# Patient Record
Sex: MALE | Race: WHITE | NOT HISPANIC OR LATINO | URBAN - METROPOLITAN AREA
[De-identification: names, ages, dates, MRNs, and addresses within clinical notes are randomized per-mention and may not be internally consistent; named-entity substitution may affect disease eponyms.]

---

## 2018-06-22 ENCOUNTER — EMERGENCY (EMERGENCY)
Facility: HOSPITAL | Age: 38
LOS: 1 days | Discharge: HOME | End: 2018-06-22
Attending: EMERGENCY MEDICINE
Payer: COMMERCIAL

## 2018-06-22 ENCOUNTER — TRANSCRIPTION ENCOUNTER (OUTPATIENT)
Age: 38
End: 2018-06-22

## 2018-06-22 VITALS
HEART RATE: 66 BPM | TEMPERATURE: 98 F | WEIGHT: 250 LBS | DIASTOLIC BLOOD PRESSURE: 83 MMHG | SYSTOLIC BLOOD PRESSURE: 134 MMHG | RESPIRATION RATE: 17 BRPM | HEIGHT: 73 IN | OXYGEN SATURATION: 98 %

## 2018-06-22 LAB — GAS PNL BLDV: SIGNIFICANT CHANGE UP

## 2018-06-22 PROCEDURE — 99243 OFF/OP CNSLTJ NEW/EST LOW 30: CPT

## 2018-06-22 PROCEDURE — 99284 EMERGENCY DEPT VISIT MOD MDM: CPT

## 2018-06-22 NOTE — ED PROVIDER NOTE - ATTENDING CONTRIBUTION TO CARE
attending Troy: 37yM previously healthy sent in from urgent care for abdominal pain and rectal bleeding. Reports 1 day intermittent periumbilical abdominal pain and one episode of BRBPR. Denies rectal pain, vomiting, anorexia, fam h/o colon CA. Started new keto diet recently. Denies ingesting beets. On exam, VSS, well-appearing, pink conjunctiva, MMM, abdomen soft, mild tenderness periumbilically, rectal exam brown stool guaiac negative, no anal fissure or palpable hemorrhoids. Will obtain labs, Ct A/P given abdominal tenderness and reassess.

## 2018-06-22 NOTE — ED PROVIDER NOTE - MEDICAL DECISION MAKING DETAILS
attending Pollack: periumbilical pain and reported rectal bleeding. Brown stool guaiac negative in ED. Will obtain labs, Ct A/P given abdominal tenderness eval for appendicitis and reassess.

## 2018-06-22 NOTE — ED PROVIDER NOTE - PROGRESS NOTE DETAILS
attending Troy: surgery at bedside attending Troy; cleared by surgical attending Lindsay for dc. Will dc with GI for reported rectal bleeding. No anemia in ED, guaiac negative.

## 2018-06-22 NOTE — ED ADULT NURSE NOTE - OBJECTIVE STATEMENT
Pt presents to the ED with complaint of abdominal pain. Pt AXOX3, reports pain starting this am at midline lower abdominal pain. No radiation, no CVA tenderness. Abdomen soft, nondistended, nontender to palpation. Pt had two episodes of blood mixed with loose stool during the day, was seen at urgent care and sent to ED. Pt denies nausea vomiting or diarrhea. No chest pain, no shortness of breath, skins color normal for age and race. no dysuria or hematuria, no fevers or chills at home.

## 2018-06-22 NOTE — ED PROVIDER NOTE - OBJECTIVE STATEMENT
attending Troy: 37yM previously healthy sent in from urgent care for abdominal pain and rectal bleeding. Reports 1 day intermittent periumbilical abdominal pain and one episode of BRBPR. Denies rectal pain, vomiting, anorexia, fam h/o colon CA. Started new keto diet recently. Denies ingesting beets.

## 2018-06-22 NOTE — ED PROVIDER NOTE - PHYSICAL EXAMINATION
On exam, VSS, well-appearing, pink conjunctiva, MMM, abdomen soft, mild tenderness periumbilically, rectal exam brown stool guaiac negative, no anal fissure or palpable hemorrhoids.

## 2018-06-23 VITALS
SYSTOLIC BLOOD PRESSURE: 129 MMHG | OXYGEN SATURATION: 98 % | RESPIRATION RATE: 18 BRPM | DIASTOLIC BLOOD PRESSURE: 81 MMHG | TEMPERATURE: 98 F | HEART RATE: 56 BPM

## 2018-06-23 LAB
ALBUMIN SERPL ELPH-MCNC: 4.9 G/DL — SIGNIFICANT CHANGE UP (ref 3.3–5)
ALP SERPL-CCNC: 52 U/L — SIGNIFICANT CHANGE UP (ref 40–120)
ALT FLD-CCNC: 31 U/L — SIGNIFICANT CHANGE UP (ref 10–45)
ANION GAP SERPL CALC-SCNC: 14 MMOL/L — SIGNIFICANT CHANGE UP (ref 5–17)
APPEARANCE UR: CLEAR — SIGNIFICANT CHANGE UP
APTT BLD: 32.5 SEC — SIGNIFICANT CHANGE UP (ref 27.5–37.4)
AST SERPL-CCNC: 21 U/L — SIGNIFICANT CHANGE UP (ref 10–40)
BASOPHILS # BLD AUTO: 0.1 K/UL — SIGNIFICANT CHANGE UP (ref 0–0.2)
BASOPHILS NFR BLD AUTO: 1.1 % — SIGNIFICANT CHANGE UP (ref 0–2)
BILIRUB SERPL-MCNC: 0.4 MG/DL — SIGNIFICANT CHANGE UP (ref 0.2–1.2)
BILIRUB UR-MCNC: NEGATIVE — SIGNIFICANT CHANGE UP
BUN SERPL-MCNC: 16 MG/DL — SIGNIFICANT CHANGE UP (ref 7–23)
CALCIUM SERPL-MCNC: 9.1 MG/DL — SIGNIFICANT CHANGE UP (ref 8.4–10.5)
CHLORIDE SERPL-SCNC: 101 MMOL/L — SIGNIFICANT CHANGE UP (ref 96–108)
CO2 SERPL-SCNC: 25 MMOL/L — SIGNIFICANT CHANGE UP (ref 22–31)
COLOR SPEC: YELLOW — SIGNIFICANT CHANGE UP
CREAT SERPL-MCNC: 1.3 MG/DL — SIGNIFICANT CHANGE UP (ref 0.5–1.3)
DIFF PNL FLD: NEGATIVE — SIGNIFICANT CHANGE UP
EOSINOPHIL # BLD AUTO: 0.2 K/UL — SIGNIFICANT CHANGE UP (ref 0–0.5)
EOSINOPHIL NFR BLD AUTO: 2.2 % — SIGNIFICANT CHANGE UP (ref 0–6)
EPI CELLS # UR: SIGNIFICANT CHANGE UP /HPF
GLUCOSE SERPL-MCNC: 86 MG/DL — SIGNIFICANT CHANGE UP (ref 70–99)
GLUCOSE UR QL: NEGATIVE — SIGNIFICANT CHANGE UP
HCT VFR BLD CALC: 45.9 % — SIGNIFICANT CHANGE UP (ref 39–50)
HGB BLD-MCNC: 15.4 G/DL — SIGNIFICANT CHANGE UP (ref 13–17)
INR BLD: 1.09 RATIO — SIGNIFICANT CHANGE UP (ref 0.88–1.16)
KETONES UR-MCNC: NEGATIVE — SIGNIFICANT CHANGE UP
LEUKOCYTE ESTERASE UR-ACNC: NEGATIVE — SIGNIFICANT CHANGE UP
LIDOCAIN IGE QN: 24 U/L — SIGNIFICANT CHANGE UP (ref 7–60)
LYMPHOCYTES # BLD AUTO: 3.5 K/UL — HIGH (ref 1–3.3)
LYMPHOCYTES # BLD AUTO: 41.5 % — SIGNIFICANT CHANGE UP (ref 13–44)
MCHC RBC-ENTMCNC: 30.1 PG — SIGNIFICANT CHANGE UP (ref 27–34)
MCHC RBC-ENTMCNC: 33.5 GM/DL — SIGNIFICANT CHANGE UP (ref 32–36)
MCV RBC AUTO: 89.8 FL — SIGNIFICANT CHANGE UP (ref 80–100)
MONOCYTES # BLD AUTO: 0.7 K/UL — SIGNIFICANT CHANGE UP (ref 0–0.9)
MONOCYTES NFR BLD AUTO: 8.1 % — SIGNIFICANT CHANGE UP (ref 2–14)
NEUTROPHILS # BLD AUTO: 4 K/UL — SIGNIFICANT CHANGE UP (ref 1.8–7.4)
NEUTROPHILS NFR BLD AUTO: 47.1 % — SIGNIFICANT CHANGE UP (ref 43–77)
NITRITE UR-MCNC: NEGATIVE — SIGNIFICANT CHANGE UP
PH UR: 6 — SIGNIFICANT CHANGE UP (ref 5–8)
PLATELET # BLD AUTO: 213 K/UL — SIGNIFICANT CHANGE UP (ref 150–400)
POTASSIUM SERPL-MCNC: 3.8 MMOL/L — SIGNIFICANT CHANGE UP (ref 3.5–5.3)
POTASSIUM SERPL-SCNC: 3.8 MMOL/L — SIGNIFICANT CHANGE UP (ref 3.5–5.3)
PROT SERPL-MCNC: 7.7 G/DL — SIGNIFICANT CHANGE UP (ref 6–8.3)
PROT UR-MCNC: NEGATIVE — SIGNIFICANT CHANGE UP
PROTHROM AB SERPL-ACNC: 11.9 SEC — SIGNIFICANT CHANGE UP (ref 9.8–12.7)
RBC # BLD: 5.11 M/UL — SIGNIFICANT CHANGE UP (ref 4.2–5.8)
RBC # FLD: 11.5 % — SIGNIFICANT CHANGE UP (ref 10.3–14.5)
RBC CASTS # UR COMP ASSIST: SIGNIFICANT CHANGE UP /HPF (ref 0–2)
SODIUM SERPL-SCNC: 140 MMOL/L — SIGNIFICANT CHANGE UP (ref 135–145)
SP GR SPEC: 1.01 — SIGNIFICANT CHANGE UP (ref 1.01–1.02)
UROBILINOGEN FLD QL: NEGATIVE — SIGNIFICANT CHANGE UP
WBC # BLD: 8.4 K/UL — SIGNIFICANT CHANGE UP (ref 3.8–10.5)
WBC # FLD AUTO: 8.4 K/UL — SIGNIFICANT CHANGE UP (ref 3.8–10.5)
WBC UR QL: SIGNIFICANT CHANGE UP /HPF (ref 0–5)

## 2018-06-23 PROCEDURE — 85730 THROMBOPLASTIN TIME PARTIAL: CPT

## 2018-06-23 PROCEDURE — 82803 BLOOD GASES ANY COMBINATION: CPT

## 2018-06-23 PROCEDURE — 84132 ASSAY OF SERUM POTASSIUM: CPT

## 2018-06-23 PROCEDURE — 83690 ASSAY OF LIPASE: CPT

## 2018-06-23 PROCEDURE — 82330 ASSAY OF CALCIUM: CPT

## 2018-06-23 PROCEDURE — 83605 ASSAY OF LACTIC ACID: CPT

## 2018-06-23 PROCEDURE — 74177 CT ABD & PELVIS W/CONTRAST: CPT | Mod: 26

## 2018-06-23 PROCEDURE — 82272 OCCULT BLD FECES 1-3 TESTS: CPT

## 2018-06-23 PROCEDURE — 80053 COMPREHEN METABOLIC PANEL: CPT

## 2018-06-23 PROCEDURE — 82435 ASSAY OF BLOOD CHLORIDE: CPT

## 2018-06-23 PROCEDURE — 81001 URINALYSIS AUTO W/SCOPE: CPT

## 2018-06-23 PROCEDURE — 85027 COMPLETE CBC AUTOMATED: CPT

## 2018-06-23 PROCEDURE — 82947 ASSAY GLUCOSE BLOOD QUANT: CPT

## 2018-06-23 PROCEDURE — 99285 EMERGENCY DEPT VISIT HI MDM: CPT | Mod: 25

## 2018-06-23 PROCEDURE — 85014 HEMATOCRIT: CPT

## 2018-06-23 PROCEDURE — 84295 ASSAY OF SERUM SODIUM: CPT

## 2018-06-23 PROCEDURE — 82565 ASSAY OF CREATININE: CPT

## 2018-06-23 PROCEDURE — 74177 CT ABD & PELVIS W/CONTRAST: CPT

## 2018-06-23 PROCEDURE — 85610 PROTHROMBIN TIME: CPT

## 2018-06-23 NOTE — CONSULT NOTE ADULT - SUBJECTIVE AND OBJECTIVE BOX
HPI: 38 yo man without prior medical or surgical history presenting with intermittent periumbilical pain for one day with an episode of blood-laced bowel movement. Patient without emesis, tolerating PO. Patient without RLQ tenderness and CT with contrast filling the appendix; equivocal for tip appendicitis. No fever or leukocytosis.     PMHx: none  PSHx: none    Medications (inpatient):   Medications (PRN):  Allergies: No Known Allergies  (Intolerances: )  Social Hx:     Physical Exam  T(C): 36.7 (18 @ 04:01)  HR: 56 (18 @ 04:01) (56 - 66)  BP: 129/81 (18 @ 04:01) (129/81 - 134/83)  RR: 18 (18 @ 04:01) (17 - 18)  SpO2: 98% (18 @ 04:01) (98% - 98%)  Tmax: T(C): , Max: 36.7 (18 @ 04:01)    General: well developed, well nourished, NAD  Neuro: alert and oriented, no focal deficits, moves all extremities spontaneously  HEENT: NCAT, EOMI, anicteric, mucosa moist  Respiratory: airway patent, respirations unlabored  CVS: regular rate and rhythm  Abdomen: soft, nontender, nondistended.   Extremities: no edema, sensation and movement grossly intact  Skin: warm, dry, appropriate color    Labs:                        15.4   8.4   )-----------( 213      ( 2018 23:49 )             45.9     PT/INR - ( 2018 23:49 )   PT: 11.9 sec;   INR: 1.09 ratio         PTT - ( 2018 23:49 )  PTT:32.5 sec      140  |  101  |  16  ----------------------------<  86  3.8   |  25  |  1.30    Ca    9.1      2018 23:49    TPro  7.7  /  Alb  4.9  /  TBili  0.4  /  DBili  x   /  AST  21  /  ALT  31  /  AlkPhos  52      Urinalysis Basic - ( 2018 00:23 )    Color: Yellow / Appearance: Clear / S.015 / pH: x  Gluc: x / Ketone: Negative  / Bili: Negative / Urobili: Negative   Blood: x / Protein: Negative / Nitrite: Negative   Leuk Esterase: Negative / RBC: 0-2 /HPF / WBC 3-5 /HPF   Sq Epi: x / Non Sq Epi: OCC /HPF / Bacteria: x            Imaging and other studies:

## 2018-06-23 NOTE — CONSULT NOTE ADULT - ASSESSMENT
36 yo man presenting with periumbilical abdominal pain and "spider-web" streaked stool with abdominal exam and labs not consistent with acute appendicitis.     - Return to ED if symptoms worsen.   - Discussed above with Dr. Lindsay Mcdonald MD PGY2

## 2018-06-23 NOTE — CONSULT NOTE ADULT - ATTENDING COMMENTS
seen and examined 6/23/2018 @ 0325    37M with 1 day of intermittent suprapubic pain and nausea.  also describes streaks of blood around stool, history of hemorrhoids and never had colonoscopy.  currently hungry.    no RLQ tednerness    WBC = 8    CT abd/pelvis - contrast filled appendix, can't r/o tip appendicitis, but no surrounding inflammation    His presentation is not consistent with acute appendicitis.  I told him to return to ER if symptoms worsen or fail to subside.  I also told him that he should undergo colonoscopy for LGIB because this could be a sign of colorectal cancer which has better outcomes if diagnosed and treated early.  -ER will refer to outpatient GI

## 2018-07-23 ENCOUNTER — TRANSCRIPTION ENCOUNTER (OUTPATIENT)
Age: 38
End: 2018-07-23

## 2018-07-26 ENCOUNTER — TRANSCRIPTION ENCOUNTER (OUTPATIENT)
Age: 38
End: 2018-07-26

## 2019-09-13 PROBLEM — Z00.00 ENCOUNTER FOR PREVENTIVE HEALTH EXAMINATION: Status: ACTIVE | Noted: 2019-09-13

## 2019-09-30 ENCOUNTER — APPOINTMENT (OUTPATIENT)
Dept: GASTROENTEROLOGY | Facility: CLINIC | Age: 39
End: 2019-09-30
Payer: SELF-PAY

## 2019-09-30 VITALS
OXYGEN SATURATION: 97 % | TEMPERATURE: 97.5 F | DIASTOLIC BLOOD PRESSURE: 80 MMHG | WEIGHT: 271 LBS | SYSTOLIC BLOOD PRESSURE: 130 MMHG | HEART RATE: 51 BPM | HEIGHT: 74 IN | BODY MASS INDEX: 34.78 KG/M2

## 2019-09-30 DIAGNOSIS — R07.89 OTHER CHEST PAIN: ICD-10-CM

## 2019-09-30 PROCEDURE — 99204 OFFICE O/P NEW MOD 45 MIN: CPT

## 2019-09-30 RX ORDER — OMEGA-3/DHA/EPA/FISH OIL 300-1000MG
1000 CAPSULE ORAL
Refills: 0 | Status: ACTIVE | COMMUNITY
Start: 2019-09-30

## 2019-10-03 ENCOUNTER — OTHER (OUTPATIENT)
Age: 39
End: 2019-10-03

## 2019-10-03 DIAGNOSIS — Z12.11 ENCOUNTER FOR SCREENING FOR MALIGNANT NEOPLASM OF COLON: ICD-10-CM

## 2019-11-05 RX ORDER — SODIUM CHLORIDE 9 MG/ML
1000 INJECTION, SOLUTION INTRAVENOUS
Refills: 0 | Status: DISCONTINUED | OUTPATIENT
Start: 2019-11-08 | End: 2019-11-28

## 2019-11-08 ENCOUNTER — RESULT REVIEW (OUTPATIENT)
Age: 39
End: 2019-11-08

## 2019-11-08 ENCOUNTER — OUTPATIENT (OUTPATIENT)
Dept: OUTPATIENT SERVICES | Facility: HOSPITAL | Age: 39
LOS: 1 days | Discharge: HOME | End: 2019-11-08
Payer: COMMERCIAL

## 2019-11-08 ENCOUNTER — APPOINTMENT (OUTPATIENT)
Dept: GASTROENTEROLOGY | Facility: HOSPITAL | Age: 39
End: 2019-11-08

## 2019-11-08 VITALS
RESPIRATION RATE: 16 BRPM | SYSTOLIC BLOOD PRESSURE: 109 MMHG | OXYGEN SATURATION: 94 % | HEART RATE: 94 BPM | DIASTOLIC BLOOD PRESSURE: 64 MMHG

## 2019-11-08 VITALS
DIASTOLIC BLOOD PRESSURE: 75 MMHG | HEIGHT: 74 IN | TEMPERATURE: 98 F | RESPIRATION RATE: 14 BRPM | SYSTOLIC BLOOD PRESSURE: 110 MMHG | OXYGEN SATURATION: 96 % | HEART RATE: 53 BPM | WEIGHT: 263.01 LBS

## 2019-11-08 DIAGNOSIS — K62.5 HEMORRHAGE OF ANUS AND RECTUM: ICD-10-CM

## 2019-11-08 PROCEDURE — 88312 SPECIAL STAINS GROUP 1: CPT | Mod: 26

## 2019-11-08 PROCEDURE — 43239 EGD BIOPSY SINGLE/MULTIPLE: CPT | Mod: GC

## 2019-11-08 PROCEDURE — 45385 COLONOSCOPY W/LESION REMOVAL: CPT | Mod: GC

## 2019-11-08 PROCEDURE — 88305 TISSUE EXAM BY PATHOLOGIST: CPT | Mod: 26

## 2019-11-08 RX ADMIN — SODIUM CHLORIDE 30 MILLILITER(S): 9 INJECTION, SOLUTION INTRAVENOUS at 12:25

## 2019-11-08 NOTE — ASU PATIENT PROFILE, ADULT - BLOOD AVOIDANCE/RESTRICTIONS, PROFILE
Benefits, risks, and possible complications of procedure explained to patient/caregiver who verbalized understanding and gave written consent. none

## 2019-11-25 NOTE — ASU PATIENT PROFILE, ADULT - PAIN CHRONIC, PROFILE
Detail Level: Simple
Price (Do Not Change): 0.00
Instructions: This plan will send the code FBSE to the PM system.  DO NOT or CHANGE the price.
no

## 2022-03-10 ENCOUNTER — NON-APPOINTMENT (OUTPATIENT)
Age: 42
End: 2022-03-10

## 2022-03-10 ENCOUNTER — APPOINTMENT (OUTPATIENT)
Dept: CARDIOLOGY | Facility: CLINIC | Age: 42
End: 2022-03-10
Payer: COMMERCIAL

## 2022-03-10 VITALS
OXYGEN SATURATION: 97 % | HEART RATE: 61 BPM | HEIGHT: 74 IN | TEMPERATURE: 98 F | WEIGHT: 265 LBS | BODY MASS INDEX: 34.01 KG/M2 | DIASTOLIC BLOOD PRESSURE: 84 MMHG | SYSTOLIC BLOOD PRESSURE: 150 MMHG

## 2022-03-10 DIAGNOSIS — R00.2 PALPITATIONS: ICD-10-CM

## 2022-03-10 PROCEDURE — 99204 OFFICE O/P NEW MOD 45 MIN: CPT

## 2022-03-10 PROCEDURE — 93000 ELECTROCARDIOGRAM COMPLETE: CPT

## 2022-03-10 NOTE — HISTORY OF PRESENT ILLNESS
[FreeTextEntry1] : 41M presents to establish cardiovascular care\par Sent in by: referred by a friend. \par PMD: none\par \par pt states in 1/2021, he had covid. states since that time, he has been experiencing palpitations at night. not every night, but maybe once a month. pt states its only when he lays on his left side. pt states it feels like he gets an adrenaline shot.\par \par pt states recently had covid again, now states he gets the same palpitations when he exercises. especially when he does cardio training. pt states he checked his pulse and it was irregular. pt states he was also experiencing diaphoresis with these episodes. \par \par pt states he can fall asleep with these palpitations, and when he wakes up they are resolved. \par  \par CP, SOB, at rest or on exertion. Pt denies palpitations, dizziness, syncope, LE edema, orthopnea \par \par \par Exercise: running, weights, boxing, daily. \par Diet: none\par \par Prior cardiac workup: none\par Recent labs:\par  \par EKG: SR 48\par \par  \par Med hx: none\par Sx hx: nose surgery. \par Fam hx: no known cardiac hx\par Social hx: lives in [place] with wife and three kids. 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 occasional cigarettes. etoh: 4 time a week. marijuana daily. \par Meds: rogaine, finasteride (for hair loss)\par Allergies: nkda\par

## 2022-03-10 NOTE — DISCUSSION/SUMMARY
[FreeTextEntry1] : 41M with no pmhx presents to establish CV care\par \par 1. palpitations\par -unclear etiology\par -EKG showing SR\par -euvolemic on exam\par -pt attributes symptoms to covid\par -will check tte to r/o structural heart dz\par -will check extended holter monitor for 5 days\par \par \par 2. bp elevated\par -elevated in office today\par -pt checks at home, usually gets 120s systolic\par -will monitor for now\par \par \par f/u after testing

## 2022-03-10 NOTE — PHYSICAL EXAM
[Well Developed] : well developed [Well Nourished] : well nourished [No Acute Distress] : no acute distress [Normal Venous Pressure] : normal venous pressure [Normal S1, S2] : normal S1, S2 [Clear Lung Fields] : clear lung fields [Good Air Entry] : good air entry [No Respiratory Distress] : no respiratory distress  [Soft] : abdomen soft [Non Tender] : non-tender [Normal Gait] : normal gait [No Edema] : no edema [Moves all extremities] : moves all extremities [No Focal Deficits] : no focal deficits [Alert and Oriented] : alert and oriented

## 2022-03-10 NOTE — REVIEW OF SYSTEMS
[Palpitations] : palpitations [Fever] : no fever [Headache] : no headache [Weight Gain (___ Lbs)] : no recent weight gain [Chills] : no chills [Feeling Fatigued] : not feeling fatigued [Weight Loss (___ Lbs)] : no recent weight loss [Blurry Vision] : no blurred vision [Sore Throat] : no sore throat [SOB] : no shortness of breath [Dyspnea on exertion] : not dyspnea during exertion [Chest Discomfort] : no chest discomfort [Lower Ext Edema] : no extremity edema [Orthopnea] : no orthopnea [Syncope] : no syncope [Cough] : no cough [Wheezing] : no wheezing [Nausea] : no nausea [Vomiting] : no vomiting [Dizziness] : no dizziness [Confusion] : no confusion was observed [Easy Bleeding] : no tendency for easy bleeding [Easy Bruising] : no tendency for easy bruising

## 2022-03-18 ENCOUNTER — APPOINTMENT (OUTPATIENT)
Dept: CARDIOLOGY | Facility: CLINIC | Age: 42
End: 2022-03-18
Payer: COMMERCIAL

## 2022-03-18 PROCEDURE — 93242 EXT ECG>48HR<7D RECORDING: CPT

## 2022-03-18 PROCEDURE — 93306 TTE W/DOPPLER COMPLETE: CPT

## 2022-03-18 PROCEDURE — ZZZZZ: CPT

## 2022-04-04 ENCOUNTER — NON-APPOINTMENT (OUTPATIENT)
Age: 42
End: 2022-04-04

## 2022-04-08 ENCOUNTER — APPOINTMENT (OUTPATIENT)
Dept: ELECTROPHYSIOLOGY | Facility: CLINIC | Age: 42
End: 2022-04-08
Payer: COMMERCIAL

## 2022-04-08 ENCOUNTER — NON-APPOINTMENT (OUTPATIENT)
Age: 42
End: 2022-04-08

## 2022-04-08 VITALS
TEMPERATURE: 97.9 F | HEART RATE: 49 BPM | OXYGEN SATURATION: 97 % | BODY MASS INDEX: 32.73 KG/M2 | HEIGHT: 74 IN | SYSTOLIC BLOOD PRESSURE: 143 MMHG | WEIGHT: 255 LBS | DIASTOLIC BLOOD PRESSURE: 80 MMHG

## 2022-04-08 DIAGNOSIS — I48.0 PAROXYSMAL ATRIAL FIBRILLATION: ICD-10-CM

## 2022-04-08 DIAGNOSIS — K62.5 HEMORRHAGE OF ANUS AND RECTUM: ICD-10-CM

## 2022-04-08 DIAGNOSIS — Z78.9 OTHER SPECIFIED HEALTH STATUS: ICD-10-CM

## 2022-04-08 PROCEDURE — 99204 OFFICE O/P NEW MOD 45 MIN: CPT

## 2022-04-08 PROCEDURE — 93000 ELECTROCARDIOGRAM COMPLETE: CPT

## 2022-04-08 NOTE — DISCUSSION/SUMMARY
[FreeTextEntry1] : Impression:\par \par 1. Paroxysmal afib: EKG performed today to assess for presence of afib and reveals sinus bradycardia. Review of Holter with Cardiologist reveals recurrent afib with RVR correlating with symptoms. ECHO with normal LVEF. Discussed treatment options for afib including rate control vs antiarrhythmics vs possible ablation. Given recurrent symptomatic afib despite rate control management and young age/preference to avoid antiarrhythmics, recommend undergoing possible afib ablation. Risks, benefits, and alternatives to procedure discussed at length. Risks including that of bleeding, infection, stroke, and cardiac tamponade discussed and he verbalizes understanding of all. Will hold all medications the morning of the ablation. May take all regularly scheduled medications the night before. Will need to initiate AC prior to ablation. [id number] score 0. Will initiate Eliquis 5mg BID 3 weeks prior and resume use 4 weeks post to minimize thromboembolic risk. \par \par Sincerely,\par \par Dimitry Frank MD

## 2022-04-08 NOTE — HISTORY OF PRESENT ILLNESS
[FreeTextEntry1] : Eliezer Lora is a 40y/o man with Hx of recurrent palpitations with newly found paroxysmal afib with RVR who presents today for initial evaluation. Admits having COVID back in 1/2021 and 1/2022. Post COVID, started feeling palpitations which were typically brief but occurred often. Believed to be secondary to stress and lack of sleep. He is a former [employer] and works out vigorously daily. More recently when exercising, he felt his legs get weak and almost given out with associated palpitations. Saw Cardiologist and underwent Holter monitoring which revealed recurrent brief afib as we all as afib with RVR correlating with symptoms. Started on low dose metoprolol but not taking daily. Did take one day and recalls not having a great workout as usual and feeling sluggish. Had ECHO which revealed normal LVEF. Denies chest pain, SOB, syncope or near syncope.\par

## 2022-04-19 RX ORDER — APIXABAN 5 MG/1
5 TABLET, FILM COATED ORAL
Qty: 60 | Refills: 5 | Status: ACTIVE | COMMUNITY
Start: 2022-04-19 | End: 1900-01-01

## 2022-04-22 ENCOUNTER — OUTPATIENT (OUTPATIENT)
Dept: OUTPATIENT SERVICES | Facility: HOSPITAL | Age: 42
LOS: 1 days | End: 2022-04-22

## 2022-04-22 VITALS
SYSTOLIC BLOOD PRESSURE: 126 MMHG | TEMPERATURE: 98 F | RESPIRATION RATE: 16 BRPM | HEART RATE: 87 BPM | DIASTOLIC BLOOD PRESSURE: 84 MMHG | HEIGHT: 73.5 IN | OXYGEN SATURATION: 98 % | WEIGHT: 259.93 LBS

## 2022-04-22 DIAGNOSIS — I48.0 PAROXYSMAL ATRIAL FIBRILLATION: ICD-10-CM

## 2022-04-22 DIAGNOSIS — Z91.89 OTHER SPECIFIED PERSONAL RISK FACTORS, NOT ELSEWHERE CLASSIFIED: ICD-10-CM

## 2022-04-22 LAB
ALBUMIN SERPL ELPH-MCNC: 4.9 G/DL — SIGNIFICANT CHANGE UP (ref 3.3–5)
ALP SERPL-CCNC: 51 U/L — SIGNIFICANT CHANGE UP (ref 40–120)
ALT FLD-CCNC: 42 U/L — HIGH (ref 4–41)
ANION GAP SERPL CALC-SCNC: 14 MMOL/L — SIGNIFICANT CHANGE UP (ref 7–14)
AST SERPL-CCNC: 28 U/L — SIGNIFICANT CHANGE UP (ref 4–40)
BILIRUB SERPL-MCNC: 0.8 MG/DL — SIGNIFICANT CHANGE UP (ref 0.2–1.2)
BLD GP AB SCN SERPL QL: NEGATIVE — SIGNIFICANT CHANGE UP
BUN SERPL-MCNC: 18 MG/DL — SIGNIFICANT CHANGE UP (ref 7–23)
CALCIUM SERPL-MCNC: 9.3 MG/DL — SIGNIFICANT CHANGE UP (ref 8.4–10.5)
CHLORIDE SERPL-SCNC: 103 MMOL/L — SIGNIFICANT CHANGE UP (ref 98–107)
CO2 SERPL-SCNC: 22 MMOL/L — SIGNIFICANT CHANGE UP (ref 22–31)
CREAT SERPL-MCNC: 1.38 MG/DL — HIGH (ref 0.5–1.3)
EGFR: 67 ML/MIN/1.73M2 — SIGNIFICANT CHANGE UP
GLUCOSE SERPL-MCNC: 86 MG/DL — SIGNIFICANT CHANGE UP (ref 70–99)
HCT VFR BLD CALC: 47.4 % — SIGNIFICANT CHANGE UP (ref 39–50)
HGB BLD-MCNC: 16 G/DL — SIGNIFICANT CHANGE UP (ref 13–17)
MCHC RBC-ENTMCNC: 30.1 PG — SIGNIFICANT CHANGE UP (ref 27–34)
MCHC RBC-ENTMCNC: 33.8 GM/DL — SIGNIFICANT CHANGE UP (ref 32–36)
MCV RBC AUTO: 89.3 FL — SIGNIFICANT CHANGE UP (ref 80–100)
NRBC # BLD: 0 /100 WBCS — SIGNIFICANT CHANGE UP
NRBC # FLD: 0 K/UL — SIGNIFICANT CHANGE UP
PLATELET # BLD AUTO: 232 K/UL — SIGNIFICANT CHANGE UP (ref 150–400)
POTASSIUM SERPL-MCNC: 4.2 MMOL/L — SIGNIFICANT CHANGE UP (ref 3.5–5.3)
POTASSIUM SERPL-SCNC: 4.2 MMOL/L — SIGNIFICANT CHANGE UP (ref 3.5–5.3)
PROT SERPL-MCNC: 7.7 G/DL — SIGNIFICANT CHANGE UP (ref 6–8.3)
RBC # BLD: 5.31 M/UL — SIGNIFICANT CHANGE UP (ref 4.2–5.8)
RBC # FLD: 12.8 % — SIGNIFICANT CHANGE UP (ref 10.3–14.5)
RH IG SCN BLD-IMP: POSITIVE — SIGNIFICANT CHANGE UP
SODIUM SERPL-SCNC: 139 MMOL/L — SIGNIFICANT CHANGE UP (ref 135–145)
WBC # BLD: 8.67 K/UL — SIGNIFICANT CHANGE UP (ref 3.8–10.5)
WBC # FLD AUTO: 8.67 K/UL — SIGNIFICANT CHANGE UP (ref 3.8–10.5)

## 2022-04-22 RX ORDER — OMEGA-3 ACID ETHYL ESTERS 1 G
0 CAPSULE ORAL
Qty: 0 | Refills: 0 | DISCHARGE

## 2022-04-22 NOTE — H&P PST ADULT - PROBLEM SELECTOR PROBLEM 1
CATARACTS OU - THE NATURE OF CATARACT SURGERY WAS EXPLAINED TO THE PATIENT. IT WAS EXPLAINED THAT RETINA PROBLEMS CAN MAKE THE RESULTS OF CATARACT SURGERY UNCERTAIN, AND THAT THE WORSE THE CATARACT, THE BETTER THE CHANCE OF IMPROVEMENT. Paroxysmal atrial fibrillation

## 2022-04-22 NOTE — H&P PST ADULT - SKIN
Pt was notified and stated she understood and will continue to take the current dose. warm and dry/color normal detailed exam

## 2022-04-22 NOTE — H&P PST ADULT - HISTORY OF PRESENT ILLNESS
42y/o male presents for preop eval for scheduled complex a-fib ablation with biosense.  Pt states since had covid 19 infection X 2 in January 2021  and later in 2021.  Pt with c/o random palpitations when lying on left side and when working out.  Eval done.  Preop dx Paroxysmal atrial fibrillation. 42y/o male presents for preop eval for scheduled complex a-fib ablation with biosense.  Pt states since had covid 19 infection X 2 in January 2021  and  January 2022. Pt with c/o random palpitations when lying on left side and when working out.  Eval done.  Preop dx Paroxysmal atrial fibrillation.

## 2022-04-22 NOTE — H&P PST ADULT - PROBLEM SELECTOR PLAN 1
Scheduled for complex afib ablation with biosense on 5/9/22  Pt instructed to follow preop instructions from Dr Frank office and to contact office with any questions.  Pt verbalized understanding  per pt scheduled within 72 hrs of this surgery

## 2022-04-22 NOTE — H&P PST ADULT - NSANTHOSAYNRD_GEN_A_CORE
No. MI screening performed.  STOP BANG Legend: 0-2 = LOW Risk; 3-4 = INTERMEDIATE Risk; 5-8 = HIGH Risk

## 2022-04-22 NOTE — H&P PST ADULT - CIGARETTES, PACKS/DAY
Pt has been screened for all eligibility/ineligibility criteria and has been determined eligible for this protocol. Informed consent was reviewed by RN with patient prior to signing. Possible side effects were discussed in detail, with patient being advised to inform RN or Dr. Bronson Hunt immediately in the event of any side effects once drug is started. All questions were answered adequately by RN or Dr. Bronson Hunt. Patient signed consent today for study NRG-. A copy of ICF given to patient. No additional questions at this time. Baseline AE's, conmeds, and questionnaires collected per protocol. 0.25

## 2022-05-27 ENCOUNTER — LABORATORY RESULT (OUTPATIENT)
Age: 42
End: 2022-05-27

## 2022-06-02 ENCOUNTER — INPATIENT (INPATIENT)
Facility: HOSPITAL | Age: 42
LOS: 0 days | Discharge: HOME | End: 2022-06-03
Attending: INTERNAL MEDICINE | Admitting: INTERNAL MEDICINE
Payer: COMMERCIAL

## 2022-06-02 VITALS
HEIGHT: 74 IN | OXYGEN SATURATION: 97 % | SYSTOLIC BLOOD PRESSURE: 119 MMHG | RESPIRATION RATE: 16 BRPM | TEMPERATURE: 98 F | DIASTOLIC BLOOD PRESSURE: 79 MMHG | WEIGHT: 254.63 LBS | HEART RATE: 86 BPM

## 2022-06-02 PROBLEM — I48.0 PAROXYSMAL ATRIAL FIBRILLATION: Chronic | Status: ACTIVE | Noted: 2022-04-22

## 2022-06-02 LAB
BLD GP AB SCN SERPL QL: NEGATIVE — SIGNIFICANT CHANGE UP
RH IG SCN BLD-IMP: POSITIVE — SIGNIFICANT CHANGE UP

## 2022-06-02 PROCEDURE — 93010 ELECTROCARDIOGRAM REPORT: CPT

## 2022-06-02 PROCEDURE — 93656 COMPRE EP EVAL ABLTJ ATR FIB: CPT

## 2022-06-02 RX ORDER — FINASTERIDE 5 MG/1
1 TABLET, FILM COATED ORAL
Qty: 0 | Refills: 0 | DISCHARGE

## 2022-06-02 RX ORDER — PANTOPRAZOLE SODIUM 20 MG/1
1 TABLET, DELAYED RELEASE ORAL
Qty: 30 | Refills: 0
Start: 2022-06-02 | End: 2022-07-01

## 2022-06-02 RX ORDER — SODIUM CHLORIDE 9 MG/ML
3 INJECTION INTRAMUSCULAR; INTRAVENOUS; SUBCUTANEOUS EVERY 8 HOURS
Refills: 0 | Status: DISCONTINUED | OUTPATIENT
Start: 2022-06-02 | End: 2022-06-03

## 2022-06-02 RX ORDER — INFLUENZA VIRUS VACCINE 15; 15; 15; 15 UG/.5ML; UG/.5ML; UG/.5ML; UG/.5ML
0.5 SUSPENSION INTRAMUSCULAR ONCE
Refills: 0 | Status: DISCONTINUED | OUTPATIENT
Start: 2022-06-02 | End: 2022-06-03

## 2022-06-02 RX ORDER — PANTOPRAZOLE SODIUM 20 MG/1
40 TABLET, DELAYED RELEASE ORAL
Refills: 0 | Status: DISCONTINUED | OUTPATIENT
Start: 2022-06-03 | End: 2022-06-03

## 2022-06-02 RX ORDER — FINASTERIDE 5 MG/1
1 TABLET, FILM COATED ORAL DAILY
Refills: 0 | Status: DISCONTINUED | OUTPATIENT
Start: 2022-06-02 | End: 2022-06-02

## 2022-06-02 RX ADMIN — SODIUM CHLORIDE 3 MILLILITER(S): 9 INJECTION INTRAMUSCULAR; INTRAVENOUS; SUBCUTANEOUS at 21:14

## 2022-06-02 NOTE — CHART NOTE - NSCHARTNOTEFT_GEN_A_CORE
Called to bedside by RN to evaluate right groin bleeding. Patient had a right groin bleeding episode earlier in the day and was put back on bedrest for another two hours.  Patient again walked after bedrest was completed which resulted in repeat bleeding. Pt was placed back in bed and pressure was held for 15 minutes. Hemostasis was achieved the dressing was reapplied.  Dr. Frank was made aware of events and is requesting admission for further groin management. Pt will be flat for 2 hours and then can sit-up in bed for 2 more hours. Eliquis will be held tonight and will be started after EP reevaluation tomorrow morning.

## 2022-06-02 NOTE — CHART NOTE - NSCHARTNOTEFT_GEN_A_CORE
Pt is s/p cardiac ablation via right femoral access.  Pt was admitted to the hospital after 2 episodes of bleeding from the groin. Pt put on bedrest until 22:00. At 22:00 pt seen at bedside, dressing clean/dry/ intact. Pt then asked to stand up for 30 seconds. Pt reevaluated 15 minutes later. Patient denies pain, numbness, tingling, CP, SOB.     T(C): 36.5 (06-02-22 @ 20:57), Max: 36.5 (06-02-22 @ 20:57)  HR: 86 (06-02-22 @ 20:57) (86 - 86)  BP: 119/79 (06-02-22 @ 20:57) (119/79 - 119/79)  RR: 16 (06-02-22 @ 20:57) (16 - 16)  SpO2: 97% (06-02-22 @ 20:57) (97% - 97%)  VSS.     Site is stable with no hematoma, active bleed or swelling.   Dressing is clean/dry/intact.   DP pulse is palpable.   no femoral bruit    Will continue to monitor.

## 2022-06-02 NOTE — CHART NOTE - NSCHARTNOTEFT_GEN_A_CORE
Notified by RN that patient is on CPAP at home for Sleep apnea. Pt states his CPAP setting is 12. CPAP started.

## 2022-06-02 NOTE — CHART NOTE - NSCHARTNOTEFT_GEN_A_CORE
called to patients bedside after patient developed right groin bleed in bathroom upon completion of bedrest. Patient states he only urinated, no BM. Patient was placed immediately in bed, when I got to bedside the RN was holding pressure at the site with no residual bleed, no hematoma. I took over manual pressure for an additional 10minutes with no bleed, no hematoma, nontender. Bedrest extended for an additional 1 hour and will attempt ambulation and monitor. Patient remained hemodynamically stable.   Discussed with Dr Frank who agrees with plan, no need for imaging at this time as groin with no pain/ no hematoma; continue to plan for discharge tonight if site remains stable  patient aware who agrees with plan

## 2022-06-02 NOTE — CHART NOTE - NSCHARTNOTEFT_GEN_A_CORE
The patient presents today for atrial fibrillation ablation  See H&P from presurgical testing.   The patient denies any new complaints since the last time she was seen by Dr. Reed.  Medications reviewed.  patient canceled 5/9/22 as patient had flu; has since recovered and feeling well, afebrile    NPO Date and Time:  Mallampati:  Anticoagulation Date and Time? Eliquis 6/1/22 at   Previous Endoscopy?  NO  Hx of CVA?  NO  Sleep Apnea?  NO  Dentures? NO  Loose Teeth? NO      Patient Denies:    Prior difficult intubation or airway problems  Odynophagia  Dysphagia  Esophageal stricture  Esophageal tumor  Esophageal varices  Esophageal perforation/laceration  Esophageal diverticulum  Large diaphragmatic Hernia  Active or recent upper gastrointestinal (GI) bleed  History of GI surgery  History of Esophageal surgery  History of Torres's esophagus  Tenuous cardiorespiratory status  Cervical spine arthritis with reduced range of motion or atlantoaxial joint disease. History of radiation to head, neck, or mediastinum  Severe thrombocytopenia  Obstructive sleep apnea The patient presents today for atrial fibrillation ablation  See H&P from presurgical testing.   The patient denies any new complaints since the last time he was seen by Dr. Frank  Medications reviewed.  patient canceled 5/9/22 as patient had flu; has since recovered and feeling well, afebrile    NPO Date and Time: 6/1/22 at 2000  Mallampati:  3  Anticoagulation Date and Time? Eliquis 6/1/22 at 1800; patient explains that he has been taking Eliquis BID consistently about 2weeks, before that he states he may miss his second dose here and there due to his late night work hours - discussed with Dr Frank, EKG with sinus bradycardia with [id number] score 0, no need for DEEPIKA   Previous Endoscopy?  NO  Hx of CVA?  NO  Sleep Apnea?  NO  Dentures? NO  Loose Teeth? NO      Patient Denies:    Prior difficult intubation or airway problems  Odynophagia  Dysphagia  Esophageal stricture  Esophageal tumor  Esophageal varices  Esophageal perforation/laceration  Esophageal diverticulum  Large diaphragmatic Hernia  Active or recent upper gastrointestinal (GI) bleed  History of GI surgery  History of Esophageal surgery  History of Torres's esophagus  Tenuous cardiorespiratory status  Cervical spine arthritis with reduced range of motion or atlantoaxial joint disease. History of radiation to head, neck, or mediastinum  Severe thrombocytopenia  Obstructive sleep apnea

## 2022-06-02 NOTE — PATIENT PROFILE ADULT - PACKS YRS CALCULATION
Pt was escribed her anastrozole on 8/18- 30 day supply with one refill.  She will be needing to establish care soon to continue with script.    Routed to MD as fyi.    5

## 2022-06-02 NOTE — PATIENT PROFILE ADULT - FALL HARM RISK - HARM RISK INTERVENTIONS

## 2022-06-03 ENCOUNTER — TRANSCRIPTION ENCOUNTER (OUTPATIENT)
Age: 42
End: 2022-06-03

## 2022-06-03 VITALS
OXYGEN SATURATION: 100 % | SYSTOLIC BLOOD PRESSURE: 130 MMHG | DIASTOLIC BLOOD PRESSURE: 65 MMHG | HEART RATE: 61 BPM | RESPIRATION RATE: 17 BRPM | TEMPERATURE: 98 F

## 2022-06-03 DIAGNOSIS — R07.9 CHEST PAIN, UNSPECIFIED: ICD-10-CM

## 2022-06-03 LAB
ANION GAP SERPL CALC-SCNC: 11 MMOL/L — SIGNIFICANT CHANGE UP (ref 7–14)
BUN SERPL-MCNC: 10 MG/DL — SIGNIFICANT CHANGE UP (ref 7–23)
CALCIUM SERPL-MCNC: 8.4 MG/DL — SIGNIFICANT CHANGE UP (ref 8.4–10.5)
CHLORIDE SERPL-SCNC: 105 MMOL/L — SIGNIFICANT CHANGE UP (ref 98–107)
CO2 SERPL-SCNC: 23 MMOL/L — SIGNIFICANT CHANGE UP (ref 22–31)
CREAT SERPL-MCNC: 1.08 MG/DL — SIGNIFICANT CHANGE UP (ref 0.5–1.3)
EGFR: 90 ML/MIN/1.73M2 — SIGNIFICANT CHANGE UP
GLUCOSE SERPL-MCNC: 130 MG/DL — HIGH (ref 70–99)
HCT VFR BLD CALC: 41.6 % — SIGNIFICANT CHANGE UP (ref 39–50)
HGB BLD-MCNC: 13.9 G/DL — SIGNIFICANT CHANGE UP (ref 13–17)
MAGNESIUM SERPL-MCNC: 1.7 MG/DL — SIGNIFICANT CHANGE UP (ref 1.6–2.6)
MCHC RBC-ENTMCNC: 30.1 PG — SIGNIFICANT CHANGE UP (ref 27–34)
MCHC RBC-ENTMCNC: 33.4 GM/DL — SIGNIFICANT CHANGE UP (ref 32–36)
MCV RBC AUTO: 90 FL — SIGNIFICANT CHANGE UP (ref 80–100)
NRBC # BLD: 0 /100 WBCS — SIGNIFICANT CHANGE UP
NRBC # FLD: 0 K/UL — SIGNIFICANT CHANGE UP
PHOSPHATE SERPL-MCNC: 3 MG/DL — SIGNIFICANT CHANGE UP (ref 2.5–4.5)
PLATELET # BLD AUTO: 205 K/UL — SIGNIFICANT CHANGE UP (ref 150–400)
POTASSIUM SERPL-MCNC: 4 MMOL/L — SIGNIFICANT CHANGE UP (ref 3.5–5.3)
POTASSIUM SERPL-SCNC: 4 MMOL/L — SIGNIFICANT CHANGE UP (ref 3.5–5.3)
RBC # BLD: 4.62 M/UL — SIGNIFICANT CHANGE UP (ref 4.2–5.8)
RBC # FLD: 12.6 % — SIGNIFICANT CHANGE UP (ref 10.3–14.5)
SODIUM SERPL-SCNC: 139 MMOL/L — SIGNIFICANT CHANGE UP (ref 135–145)
WBC # BLD: 13.06 K/UL — HIGH (ref 3.8–10.5)
WBC # FLD AUTO: 13.06 K/UL — HIGH (ref 3.8–10.5)

## 2022-06-03 RX ORDER — APIXABAN 2.5 MG/1
1 TABLET, FILM COATED ORAL
Qty: 0 | Refills: 0 | DISCHARGE

## 2022-06-03 RX ORDER — APIXABAN 2.5 MG/1
1 TABLET, FILM COATED ORAL
Qty: 60 | Refills: 0
Start: 2022-06-03 | End: 2022-07-02

## 2022-06-03 RX ORDER — APIXABAN 2.5 MG/1
5 TABLET, FILM COATED ORAL EVERY 12 HOURS
Refills: 0 | Status: DISCONTINUED | OUTPATIENT
Start: 2022-06-03 | End: 2022-06-03

## 2022-06-03 RX ORDER — PANTOPRAZOLE SODIUM 20 MG/1
1 TABLET, DELAYED RELEASE ORAL
Qty: 30 | Refills: 0
Start: 2022-06-03 | End: 2022-07-02

## 2022-06-03 RX ADMIN — PANTOPRAZOLE SODIUM 40 MILLIGRAM(S): 20 TABLET, DELAYED RELEASE ORAL at 05:24

## 2022-06-03 RX ADMIN — SODIUM CHLORIDE 3 MILLILITER(S): 9 INJECTION INTRAMUSCULAR; INTRAVENOUS; SUBCUTANEOUS at 13:09

## 2022-06-03 RX ADMIN — SODIUM CHLORIDE 3 MILLILITER(S): 9 INJECTION INTRAMUSCULAR; INTRAVENOUS; SUBCUTANEOUS at 06:28

## 2022-06-03 NOTE — DISCHARGE NOTE PROVIDER - CARE PROVIDERS DIRECT ADDRESSES
[email or web address]

## 2022-06-03 NOTE — DISCHARGE NOTE NURSING/CASE MANAGEMENT/SOCIAL WORK - PATIENT PORTAL LINK FT
You can access the FollowMyHealth Patient Portal offered by [facility] by registering at the following website: [email or web address]. By joining [facility]’s FollowMyHealth portal, you will also be able to view your health information using other applications (apps) compatible with our system.

## 2022-06-03 NOTE — DISCHARGE NOTE PROVIDER - NSDCCPCAREPLAN_GEN_ALL_CORE_FT
PRINCIPAL DISCHARGE DIAGNOSIS  Diagnosis: Atrial fibrillation  Assessment and Plan of Treatment: You were admitted to the hospital for an elective ablation procedure.  Your ablation procedure was successful.  You had bleeding noted from your right groin and you were kept for observation overnight.  The bleeding has since resolved, and there as been no further episodes of bleeding.  Continue your Eliquis as prescribed, next dose due 6/3/2022 @ 6pm.  Start Protonix 40 mg daily as ordered for 30 days.   Follow up with EP Dr. Frank on 7/1/2022 at 10 am as scheduled, or sooner of needed.  Follow up with your primary care doctor within one week of discharge.      SECONDARY DISCHARGE DIAGNOSES  Diagnosis: S/P ablation of atrial fibrillation  Assessment and Plan of Treatment: Continue Eliquis tonight for evening dose after 6pm if groin remains with no bleed and no swelling. It is very important that you take your Eliquis every 12hours to protect your future health.  Please monitor for any signs of bleeding and bruising and report these to your EP Team, cardiologist, and/or primary care provider.

## 2022-06-03 NOTE — DISCHARGE NOTE PROVIDER - NSDCFUADDAPPT_GEN_ALL_CORE_FT
Follow up with EP, Dr. Frank, as scheduled on 7/1/2022 at 10 am at [facility], 4th floor Oncology [facility].  For questions concerning your appointment, please call [phone].    Follow up with your primary care doctor within one week of discharge. If you do not have one, you can call the medicine clinic at [phone] to make an appointment.

## 2022-06-03 NOTE — DISCHARGE NOTE PROVIDER - CARE PROVIDER_API CALL
Dimitry Frank (MD)  Cardiac Electrophysiology; Cardiovascular Disease; Internal Medicine  [phone] [place] [address], [id number]  [place] [id number]  Phone: [phone]  Fax: [phone]  Follow Up Time:     Your Primary Care Doctor,   Phone: (   )    -  Fax: (   )    -  Follow Up Time:

## 2022-06-03 NOTE — DISCHARGE NOTE PROVIDER - NSDCFUSCHEDAPPT_GEN_ALL_CORE_FT
Dimitry Frank  [facility]  Cardio Electro [phone]  Scheduled Appointment: 07/01/2022

## 2022-06-03 NOTE — PROGRESS NOTE ADULT - ASSESSMENT
42 yo male with PMhx of COVID 1/2021 and 1/2022 who, post COVID, started feeling palpitations, initially brief but often. Now with newly diagnosed atrial fibrillation w/RVR (seen on Holter). He is a former [employer] [place] and has a vigorous work out schedule. Also with suspected MI. Patient underwent successful atrial fibrillation ablation yesterday. Tolerated the procedure well. However, had bleeding from the right groin with ambulation post procedure and kept overnight for observation. Anticoagulation held. No further bleeding overnight.   ECHO 3/18/22 with normal LVEF.   Plan: s/p AFib ablation 6/2/22.   Post procedure ablation teaching done.   Written instructions and contact information provided.   Patient to start Eliquis 5 mg bid tonight.   To continue Protonix.   He has a follow-up appointment with Dr. Frank on 7/1/22 at 10:00am  4th [facility] Oncology [facility] [phone].

## 2022-06-03 NOTE — DISCHARGE NOTE PROVIDER - NSDCMRMEDTOKEN_GEN_ALL_CORE_FT
Discharge instructions: Follow upwith Dr Frank on July 1st at 10am or sooner if needed  Start Protonix 40mg oral daily for 30days  Continue Eliquis tonight for evening dose after 6pm if groin remains with no bleed and no swelling. It is very important that you take your Eliquis every 12hours to protect your future health  Tylenol as needed for site discomfort  Eliquis 5 mg oral tablet: 1 tab(s) orally 2 times a day  finasteride 1 mg oral tablet: 1 tab(s) orally once a day  Protonix 40 mg oral delayed release tablet: 1 tab(s) orally once a day x 30 days

## 2022-06-03 NOTE — DISCHARGE NOTE PROVIDER - HOSPITAL COURSE
40 yo male with PMhx of suspected MI (on CPAP at home) and COVID 1/2021 and 1/2022 who, post COVID, started feeling palpitations, initially brief but then often, now with newly diagnosed atrial fibrillation w/RVR (seen on Holter) and started on Eliquis. He is a [employer] and has a vigorous work out schedule. He was admitted for atrial fibrillation ablation and underwent successful afib ablation 6/2/2022.  He tolerated the procedure well, however, had bleeding from the right groin with ambulation post procedure and kept overnight for observation, anticoagulation held, no further bleeding overnight.  Medically cleared by EP for discharge home today and instructed to resume Eliquis 5 mg bid tonight and continue Protonix 40 mg daily x 30 days.  He has a follow-up appointment with Dr. Frank on 7/1/22 at 10:00am, 4th floor Oncology [facility] [phone].     Patient seen and evaluated. Reviewed discharge medications with patient and attending. All new medications requiring new prescriptions were sent to the pharmacy of patient's choice. Reviewed need for prescription for previous home medications and new prescriptions sent if requested. Medically cleared/stable for discharge Home as per Dr. Frank on 6/3/2022 with appropriate follow up. Patient understands and agrees with plan of care.

## 2022-06-03 NOTE — PROGRESS NOTE ADULT - SUBJECTIVE AND OBJECTIVE BOX
Patient feeling well. Denies palpitations, lightheadedness or shortness of breath. Did have mild chest discomfort last night which has resolved.   No right groin bleeding overnight. Dressing dry and intact. No pain at the site.     Vital Signs Last 24 Hrsright groin bleeding   T(C): 36.5 (03 Jun 2022 09:20), Max: 36.5 (02 Jun 2022 20:57)  T(F): 97.7 (03 Jun 2022 09:20), Max: 97.7 (02 Jun 2022 20:57)  HR: 61 (03 Jun 2022 09:20) (61 - 86)  BP: 130/65 (03 Jun 2022 09:20) (113/60 - 130/65)  BP(mean): --  RR: 17 (03 Jun 2022 09:20) (16 - 17)  SpO2: 100% (03 Jun 2022 09:20) (94% - 100%)      EKG: Sinus bradycardia 50 bpm. QRSd 96ms.  QTc 430ms.  Telemetry: sinus rhythm 50-80's. No AFib overnight.   MEDICATIONS  (STANDING):  influenza   Vaccine 0.5 milliLiter(s) IntraMuscular once  pantoprazole    Tablet 40 milliGRAM(s) Oral before breakfast  sodium chloride 0.9% lock flush 3 milliLiter(s) IV Push every 8 hours    MEDICATIONS  (PRN):          Physical exam:   Gen- well developed well nourished NAD.   Resp- clear to auscultation. No wheezing, rales or rhonchi  CV- S1 and S2 RRR. No murmurs, gallops or rubs  ABD- soft nontender + bowel sounds  EXT- right groin dressing dry and intact. No bleeding, hematoma, ecchymosis or pain to palpation.  Neuro- grossly nonfocal                            13.9   13.06 )-----------( 205      ( 03 Jun 2022 06:35 )             41.6       06-03    139  |  105  |  10  ----------------------------<  130<H>  4.0   |  23  |  1.08    Ca    8.4      03 Jun 2022 06:35  Phos  3.0     06-03  Mg     1.70     06-03

## 2022-06-03 NOTE — DISCHARGE NOTE NURSING/CASE MANAGEMENT/SOCIAL WORK - NSDCFUADDAPPT_GEN_ALL_CORE_FT
Follow up with EP, Dr. Frank, as scheduled on 7/1/2022 at 10 am at [facility], 4th floor Oncology [facility].  For questions concerning your appointment, please call [phone].    Follow up with your primary care doctor within one week of discharge. If you do not have one, you can call the medicine clinic at [phone] to make an appointment.

## 2022-06-03 NOTE — DISCHARGE NOTE PROVIDER - PROVIDER TOKENS
PROVIDER:[TOKEN:[[id number]]],FREE:[LAST:[Your Primary Care Doctor],PHONE:[(   )    -],FAX:[(   )    -]]

## 2022-06-03 NOTE — CHART NOTE - NSCHARTNOTEFT_GEN_A_CORE
Patient seen/examined at bedside.  R femoral site s/p ablation yesterday. Dressing is clear/dry/intact. Site is without hematoma or bleeding. Sensation and OLIVIA intact. Distal pulses palpable 2+, capillary refill < 2 seconds. Patient denies pain, numbness, tingling, CP or SOB    Vital Signs Last 24 Hrs  T(C): 36.5 (06-03-22 @ 09:20), Max: 36.5 (06-02-22 @ 20:57)  T(F): 97.7 (06-03-22 @ 09:20), Max: 97.7 (06-02-22 @ 20:57)  HR: 61 (06-03-22 @ 09:20) (61 - 86)  BP: 130/65 (06-03-22 @ 09:20) (113/60 - 130/65)  BP(mean): --  RR: 17 (06-03-22 @ 09:20) (16 - 17)  SpO2: 100% (06-03-22 @ 09:20) (94% - 100%)    Case discussed w/ EP MARTELL Olson, recommendations as follows:  - Patient is medically cleared for dc home today, restart Reese Lezama NP-BC  Department of Medicine  In House Pager #[phone]

## 2022-07-01 ENCOUNTER — NON-APPOINTMENT (OUTPATIENT)
Age: 42
End: 2022-07-01

## 2022-07-01 ENCOUNTER — APPOINTMENT (OUTPATIENT)
Dept: ELECTROPHYSIOLOGY | Facility: CLINIC | Age: 42
End: 2022-07-01

## 2022-07-01 VITALS
HEIGHT: 74 IN | WEIGHT: 255 LBS | OXYGEN SATURATION: 98 % | SYSTOLIC BLOOD PRESSURE: 126 MMHG | BODY MASS INDEX: 32.73 KG/M2 | DIASTOLIC BLOOD PRESSURE: 75 MMHG | HEART RATE: 67 BPM

## 2022-07-01 DIAGNOSIS — R00.2 PALPITATIONS: ICD-10-CM

## 2022-07-01 PROCEDURE — 99213 OFFICE O/P EST LOW 20 MIN: CPT

## 2022-07-01 PROCEDURE — 93000 ELECTROCARDIOGRAM COMPLETE: CPT

## 2022-07-01 RX ORDER — PANTOPRAZOLE 40 MG/1
40 TABLET, DELAYED RELEASE ORAL
Qty: 30 | Refills: 0 | Status: DISCONTINUED | COMMUNITY
Start: 2022-06-02

## 2022-07-01 RX ORDER — COLCHICINE 0.6 MG/1
0.6 CAPSULE ORAL
Qty: 30 | Refills: 0 | Status: DISCONTINUED | COMMUNITY
Start: 2022-06-03 | End: 2022-07-01

## 2022-07-01 RX ORDER — METOPROLOL SUCCINATE 25 MG/1
25 TABLET, EXTENDED RELEASE ORAL DAILY
Qty: 90 | Refills: 3 | Status: DISCONTINUED | COMMUNITY
Start: 2022-03-29 | End: 2022-07-01

## 2022-07-01 RX ORDER — SODIUM SULFATE, POTASSIUM SULFATE, MAGNESIUM SULFATE 17.5; 3.13; 1.6 G/ML; G/ML; G/ML
17.5-3.13-1.6 SOLUTION, CONCENTRATE ORAL
Qty: 1 | Refills: 0 | Status: DISCONTINUED | COMMUNITY
Start: 2019-10-03 | End: 2022-07-01

## 2022-07-01 NOTE — HISTORY OF PRESENT ILLNESS
[FreeTextEntry1] : Eliezer Lora is a 42y/o man with Hx of recurrent palpitations with newly found paroxysmal afib with RVR who presents today for follow up post PVI ablation on 6/2/22. COVID in 1/2021 and 1/2022, since that time his palpitations were frequent but brief, which he noticed while laying on his left side. He has a strict exercise regimen, and noticed he was unable to tolerate his workouts due to weakness and palpitations experienced during exercise. His holter monitor revealed recurrent brief AFIB and AFIB with RVR with correlating symptoms.  Started on low dose Metoprolol but was not taking it daily because it often made him feel sluggish and impacted his workouts. Since his ablation, he has not felt any recurrent palpitations. He is able to lay on his left side comfortably. He is currently taking Eliquis for thromboembolic prophylaxis with not bleeding issues.  Denies chest pain/pressure, palpitations, SOB/CLARK, lightheadedness/dizziness, and syncope. \par \par \par \par

## 2022-07-01 NOTE — DISCUSSION/SUMMARY
[EKG obtained to assist in diagnosis and management of assessed problem(s)] : EKG obtained to assist in diagnosis and management of assessed problem(s) [FreeTextEntry1] : Impression:\par \par 1. Paroxysmal afib: EKG performed today to assess for presence of afib and reveals normal sinus rhythm. Remains on Eliquis for thromboembolic prophylaxis. Will resume at this time. Discussed that he could stop anticoagulation now.  He was prescribed Metoprolol Succinate 25 mg, but is no longer taking this medication. Overall, feeling well. He may resume his workout regimen as desired.  He will also try to lose weight. \par \par 2. MI: patient does snore loudly and suspects he does have MI.  He has fractured his nose frequently in the past and also has a large tongue.  Ordered sleep study and suggested he see ENT to examine his oropharynx. \par \par Will continue f/u with Cardiologist and may RTO as needed or if any new or worsening symptoms or findings occur. \par \par Sincerely,\par \par Dimitry Frank MD

## 2024-04-12 NOTE — ED PROVIDER NOTE - NSTIMEPROVIDERCAREINITIATE_GEN_ER
[facility]                    Pharmacy       Discharge Medication Education    Patient ACCEPTED medication education. Pharmacy has provided education on the name, indication, and possible side effects of the medication(s) prescribed, using teach-back method.     The following medications have also been discussed, during this admission.        Medication List        CHANGE how you take these medications      tamsulosin 0.4 mg Cap  Commonly known as: FLOMAX  TAKE 2 CAPSULES(0.8 MG) BY MOUTH EVERY DAY  What changed: when to take this            CONTINUE taking these medications      atorvastatin 40 MG tablet  Commonly known as: LIPITOR     bisacodyL 10 mg Supp  Commonly known as: DULCOLAX (BISACODYL)  Place 1 suppository (10 mg total) rectally daily as needed (constipation).     CALMOSEPTINE 0.44-20.6 % Oint  Generic drug: menthol-zinc oxide     CHEST CONGESTION RELIEF DM  mg/5 mL liquid  Generic drug: dextromethorphan-guaiFENesin  mg/5 ml     cyproheptadine 4 mg tablet  Commonly known as: PERIACTIN     docusate sodium 100 MG capsule  Commonly known as: COLACE  Take 1 capsule (100 mg total) by mouth once daily.     folic acid 1 MG tablet  Commonly known as: FOLVITE     gabapentin 300 MG capsule  Commonly known as: NEURONTIN     mirtazapine 30 MG tablet  Commonly known as: REMERON     polyethylene glycol 17 gram/dose powder  Commonly known as: GLYCOLAX               Thank you  Sebastien Love, PharmD  [phone]     22-Jun-2018 23:06